# Patient Record
Sex: MALE
[De-identification: names, ages, dates, MRNs, and addresses within clinical notes are randomized per-mention and may not be internally consistent; named-entity substitution may affect disease eponyms.]

---

## 2023-03-20 PROBLEM — Z00.00 ENCOUNTER FOR PREVENTIVE HEALTH EXAMINATION: Status: ACTIVE | Noted: 2023-03-20

## 2023-03-28 ENCOUNTER — APPOINTMENT (OUTPATIENT)
Dept: UROLOGY | Facility: CLINIC | Age: 43
End: 2023-03-28
Payer: MEDICAID

## 2023-03-28 VITALS
SYSTOLIC BLOOD PRESSURE: 131 MMHG | OXYGEN SATURATION: 99 % | TEMPERATURE: 97.1 F | HEIGHT: 67 IN | DIASTOLIC BLOOD PRESSURE: 80 MMHG | WEIGHT: 175 LBS | BODY MASS INDEX: 27.47 KG/M2 | HEART RATE: 71 BPM

## 2023-03-28 DIAGNOSIS — Z30.09 ENCOUNTER FOR OTHER GENERAL COUNSELING AND ADVICE ON CONTRACEPTION: ICD-10-CM

## 2023-03-28 PROCEDURE — 99203 OFFICE O/P NEW LOW 30 MIN: CPT

## 2023-03-28 NOTE — ASSESSMENT
[FreeTextEntry1] : Very pleasant 43 year old gentleman who presents for evaluation and counseling for vasectomy\par -Patient was counseled on the risks of a vasectomy for elective sterilization. He was counseled that this is intended to be a nonreversible, permanent procedure. Procedural details were discussed with the patient at length. We discussed that he will need to use an alternative form of contraception until a postoperative semen analysis demonstrates that he is sterile. Patient understands the risks of the procedure, including but not limited to failure of the procedure, bleeding, infection, short-term and chronic testicular discomfort or abdominal pain, and wishes to proceed. [facility] consent form signed in the office. Will schedule procedure after 30 day period.\par

## 2023-03-28 NOTE — HISTORY OF PRESENT ILLNESS
[FreeTextEntry1] : Very pleasant 43 year old gentleman who presents for evaluation and counseling for vasectomy. Patient reports that he has 2 children and does not desire additional children. He reports that his partner also does not desire additional children. She is unable to accompany him to the visit today, however is supportive of his decision to undergo a vasectomy for permanent sterilization. He denies problems with erections. He denies problems with ejaculation. He denies difficulty with urination. No other complaints.\par

## 2023-06-05 ENCOUNTER — APPOINTMENT (OUTPATIENT)
Dept: UROLOGY | Facility: CLINIC | Age: 43
End: 2023-06-05

## 2023-07-24 ENCOUNTER — NON-APPOINTMENT (OUTPATIENT)
Age: 43
End: 2023-07-24

## 2023-07-24 ENCOUNTER — APPOINTMENT (OUTPATIENT)
Dept: UROLOGY | Facility: CLINIC | Age: 43
End: 2023-07-24

## 2023-08-31 ENCOUNTER — EMERGENCY (EMERGENCY)
Facility: HOSPITAL | Age: 43
LOS: 1 days | Discharge: HOME | End: 2023-08-31
Attending: STUDENT IN AN ORGANIZED HEALTH CARE EDUCATION/TRAINING PROGRAM
Payer: MEDICAID

## 2023-08-31 VITALS
OXYGEN SATURATION: 96 % | TEMPERATURE: 100 F | RESPIRATION RATE: 20 BRPM | SYSTOLIC BLOOD PRESSURE: 154 MMHG | HEART RATE: 113 BPM | HEIGHT: 67 IN | WEIGHT: 177.91 LBS | DIASTOLIC BLOOD PRESSURE: 93 MMHG

## 2023-08-31 PROCEDURE — 99283 EMERGENCY DEPT VISIT LOW MDM: CPT

## 2023-09-01 VITALS
SYSTOLIC BLOOD PRESSURE: 122 MMHG | HEART RATE: 89 BPM | TEMPERATURE: 98 F | OXYGEN SATURATION: 99 % | RESPIRATION RATE: 18 BRPM | DIASTOLIC BLOOD PRESSURE: 68 MMHG

## 2023-09-01 PROCEDURE — 99283 EMERGENCY DEPT VISIT LOW MDM: CPT

## 2023-09-01 RX ORDER — OXYCODONE HYDROCHLORIDE 5 MG/1
1 TABLET ORAL
Qty: 12 | Refills: 0
Start: 2023-09-01 | End: 2023-09-03

## 2023-09-01 RX ORDER — OXYCODONE HYDROCHLORIDE 5 MG/1
5 TABLET ORAL ONCE
Refills: 0 | Status: DISCONTINUED | OUTPATIENT
Start: 2023-09-01 | End: 2023-09-01

## 2023-09-01 RX ADMIN — Medication 50 MILLIGRAM(S): at 01:29

## 2023-09-01 RX ADMIN — OXYCODONE HYDROCHLORIDE 5 MILLIGRAM(S): 5 TABLET ORAL at 01:30

## 2023-09-01 NOTE — ED ADULT NURSE NOTE - NSFALLRISKINTERV_ED_ALL_ED
Assistance OOB with selected safe patient handling equipment if applicable/Assistance with ambulation/Communicate fall risk and risk factors to all staff, patient, and family/Monitor gait and stability/Provide visual cue: yellow wristband, yellow gown, etc/Reinforce activity limits and safety measures with patient and family/Call bell, personal items and telephone in reach/Instruct patient to call for assistance before getting out of bed/chair/stretcher/Non-slip footwear applied when patient is off stretcher/[place] to call system/Physically safe environment - no spills, clutter or unnecessary equipment/Purposeful Proactive Rounding/Room/bathroom lighting operational, light cord in reach Assistance OOB with selected safe patient handling equipment if applicable/Assistance with ambulation/Communicate fall risk and risk factors to all staff, patient, and family/Monitor gait and stability/Provide visual cue: yellow wristband, yellow gown, etc/Reinforce activity limits and safety measures with patient and family/Call bell, personal items and telephone in reach/Instruct patient to call for assistance before getting out of bed/chair/stretcher/Non-slip footwear applied when patient is off stretcher/[place] to call system/Physically safe environment - no spills, clutter or unnecessary equipment/Purposeful Proactive Rounding/Room/bathroom lighting operational, light cord in reach Assistance OOB with selected safe patient handling equipment if applicable/Assistance with ambulation/Communicate fall risk and risk factors to all staff, patient, and family/Monitor gait and stability/Provide visual cue: yellow wristband, yellow gown, etc/Reinforce activity limits and safety measures with patient and family/Call bell, personal items and telephone in reach/Instruct patient to call for assistance before getting out of bed/chair/stretcher/Non-slip footwear applied when patient is off stretcher/[place] to call system/Physically safe environment - no spills, clutter or unnecessary equipment/Purposeful Proactive Rounding/Room/bathroom lighting operational, light cord in reach

## 2023-09-01 NOTE — ED PROVIDER NOTE - NSFOLLOWUPINSTRUCTIONS_ED_ALL_ED_FT
your prescriptions and take as directed.    Return to the emergency room for any new or worsening symptoms.    See attached information on gout flare.    Gout    A foot with gout, showing uric acid crystals in a joint, along with an image that shows swelling on the outside of the foot.  Gout is painful swelling of your joints. Gout is a type of arthritis. It is caused by having too much uric acid in your body. Uric acid is a chemical that is made when your body breaks down substances called purines. If your body has too much uric acid, sharp crystals can form and build up in your joints. This causes pain and swelling.    Gout attacks can happen quickly and be very painful (acute gout). Over time, the attacks can affect more joints and happen more often (chronic gout).    What are the causes?  Gout is caused by too much uric acid in your blood. This can happen because:  Your kidneys do not remove enough uric acid from your blood.  Your body makes too much uric acid.  You eat too many foods that are high in purines. These foods include organ meats, some seafood, and beer.  Trauma or stress can bring on an attack.    What increases the risk?  Having a family history of gout.  Being male and middle-aged.  Being female and having gone through menopause.  Having an organ transplant.  Taking certain medicines.  Having certain conditions, such as:  Being very overweight (obese).  Lead poisoning.  Kidney disease.  A skin condition called psoriasis.  Other risks include:  Losing weight too quickly.  Not having enough water in the body (being dehydrated).  Drinking alcohol, especially beer.  Drinking beverages that are sweetened with a type of sugar called fructose.  What are the signs or symptoms?  A foot and ankle with swelling and shiny, purple skin caused by gout.   An attack of acute gout often starts at night and usually happens in just one joint. The most common place is the big toe. Other joints that may be affected include joints of the feet, ankle, knee, fingers, wrist, or elbow. Symptoms may include:  Very bad pain.  Warmth.  Swelling.  Stiffness.  Tenderness. The affected joint may be very painful to touch.  Shiny, red, or purple skin.  Chills and fever.  Chronic gout may cause symptoms more often. More joints may be involved. You may also have white or yellow lumps (tophi) on your hands or feet or in other areas near your joints.    How is this treated?  Treatment for an acute attack may include medicines for pain and swelling, such as:  NSAIDs, such as ibuprofen.  Steroids taken by mouth or injected into a joint.  Colchicine. This can be given by mouth or through an IV tube.  Treatment to prevent future attacks may include:  Taking small doses of NSAIDs or colchicine daily.  Using a medicine that reduces uric acid levels in your blood, such as allopurinol.  Making changes to your diet. You may need to see a food expert (dietitian) about what to eat and drink to prevent gout.  Follow these instructions at home:  During a gout attack    Bag of ice on a towel on the skin.   If told, put ice on the painful area. To do this:  Put ice in a plastic bag.  Place a towel between your skin and the bag.  Leave the ice on for 20 minutes, 2–3 times a day.  Take off the ice if your skin turns bright red. This is very important. If you cannot feel pain, heat, or cold, you have a greater risk of damage to the area.  Raise the painful joint above the level of your heart as often as you can.  Rest the joint as much as possible. If the joint is in your leg, you may be given crutches.  Follow instructions from your doctor about what you cannot eat or drink.  Avoiding future gout attacks    Eat a low-purine diet. Avoid foods and drinks such as:  Liver.  Kidney.  Anchovies.  Asparagus.  Herring.  Mushrooms.  Mussels.  Beer.  Stay at a healthy weight. If you want to lose weight, talk with your doctor. Do not lose weight too fast.  Start or continue an exercise plan as told by your doctor.  Eating and drinking    Avoid drinks sweetened by fructose.  Drink enough fluids to keep your pee (urine) pale yellow.  If you drink alcohol:  Limit how much you have to:  0–1 drink a day for women who are not pregnant.  0–2 drinks a day for men.  Know how much alcohol is in a drink. In the [place] one drink equals one 12 oz bottle of beer (355 mL), one 5 oz glass of wine (148 mL), or one 1½ oz glass of hard liquor (44 mL).  General instructions    Take over-the-counter and prescription medicines only as told by your doctor.  Ask your doctor if you should avoid driving or using machines while you are taking your medicine.  Return to your normal activities when your doctor says that it is safe.  Keep all follow-up visits.  Where to find more information  National Institutes of Health: www.niams.nih.gov  Contact a doctor if:  You have another gout attack.  You still have symptoms of a gout attack after 10 days of treatment.  You have problems (side effects) because of your medicines.  You have chills or a fever.  You have burning pain when you pee (urinate).  You have pain in your lower back or belly.  Get help right away if:  You have very bad pain.  Your pain cannot be controlled.  You cannot pee.  Summary  Gout is painful swelling of the joints.  The most common site of pain is the big toe, but it can affect other joints.  Medicines and avoiding some foods can help to prevent and treat gout attacks.  This information is not intended to replace advice given to you by your health care provider. Make sure you discuss any questions you have with your health care provider.

## 2023-09-01 NOTE — ED ADULT NURSE NOTE - OBJECTIVE STATEMENT
43y male A&OX4 coming in through triage complaining of Left knee pain. No PMHX. Pt reports having swelling and knee pain that started Monday and progressivly gotten worse and decided to get checked in. Pt denies chest pain, shortness of breath, abd pain, N/V/D, fever, cough. Pt left knee is swollen compared to right knee. Pt pending dispo.

## 2023-09-01 NOTE — ED PROVIDER NOTE - PHYSICAL EXAMINATION
Const: Well-nourished, Well-developed, appearing stated age.  Eyes: no conjunctival injection, and symmetrical lids.  HEENT: Head NCAT, no lesions.   Neck: Symmetric, trachea midline.   CVS: +S1/S2, Radial and DP pulses 2+ b/l.   RESP: Unlabored respiratory effort. Clear to auscultation bilaterally.  GI: Nontender/Nondistended, No CVA tenderness b/l.   MSK: +  Left knee warm to touch, not erythematous.  passive range of motion intact.  Active range of motion slightly diminished.  Skin: Warm, dry and intact.   Neuro: Fluent Speech. Moving all extremities.   Psych: Awake, Alert, & Oriented (AAO) x3. Appropriate mood and affect.

## 2023-09-01 NOTE — ED PROVIDER NOTE - PROGRESS NOTE DETAILS
POCUS of the left knee without significant fluid collection in the joint.  No indication for drainage based on story, exam, POCUS.  Will reassess to dispo. Jason Lema, ED Attending Jason Lema, ED Attending Pt feels much better, will DC home with prednisone and a few days of Oxy.  Able to range his knee actively now.  Able to ambulate.  Vital signs improved.  Well-appearing.  Plan for DC  With return precautions and follow-up instructions with teach back.

## 2023-09-01 NOTE — ED PROVIDER NOTE - PATIENT PORTAL LINK FT
You can access the FollowMyHealth Patient Portal offered by [facility] by registering at the following website: [email or web address]. By joining [employer]’s FollowMyHealth portal, you will also be able to view your health information using other applications (apps) compatible with our system. You can access the FollowMyHealth Patient Portal offered by [facility] by registering at the following website: [email or web address]. By joining [employer]’s FollowMyHealth portal, you will also be able to view your health information using other applications (apps) compatible with our system. You can access the FollowMyHealth Patient Portal offered by [facility] by registering at the following website: [email or web address]. By joining [employer]’s FollowMyHealth portal, you will also be able to view your health information using other applications (apps) compatible with our system.

## 2023-09-01 NOTE — ED PROVIDER NOTE - CLINICAL SUMMARY MEDICAL DECISION MAKING FREE TEXT BOX
43-year-old male with past medical history of gout presenting with chief complaint of left knee pain.  No inciting event or injury.  States that is consistent with prior gout flare.  On exam, vital signs stable, patient able to range the knee.  On POCUS very minimal fluid in the joint space.  Do not suspect septic joint given patient has range of motion and barely any fluid on POCUS.  Likely failing treatment with NSAIDs, flare likely secondary to recent changes in p.o. intake/vacation.   Plan for Oxy, prednisone.  Would benefit from a prednisone taper.  Patient has follow-up.  if symptoms improve, plan for reassessment and disposition. Jason Lema, ED Attending

## 2024-10-26 ENCOUNTER — EMERGENCY (EMERGENCY)
Facility: HOSPITAL | Age: 44
LOS: 1 days | Discharge: HOME | End: 2024-10-26
Attending: EMERGENCY MEDICINE
Payer: SELF-PAY

## 2024-10-26 VITALS
SYSTOLIC BLOOD PRESSURE: 165 MMHG | WEIGHT: 184.09 LBS | HEIGHT: 67 IN | RESPIRATION RATE: 18 BRPM | OXYGEN SATURATION: 99 % | DIASTOLIC BLOOD PRESSURE: 82 MMHG | TEMPERATURE: 98 F | HEART RATE: 86 BPM

## 2024-10-26 LAB
B PERT IGG+IGM PNL SER: ABNORMAL
COLOR FLD: YELLOW
FLUID INTAKE SUBSTANCE CLASS: SIGNIFICANT CHANGE UP
GLUCOSE FLD-MCNC: 107 MG/DL — SIGNIFICANT CHANGE UP
LYMPHOCYTES # FLD: 1 % — SIGNIFICANT CHANGE UP
MONOS+MACROS # FLD: 11 % — SIGNIFICANT CHANGE UP
NEUTROPHILS-BODY FLUID: 88 % — SIGNIFICANT CHANGE UP
PROT FLD-MCNC: 3.7 G/DL — SIGNIFICANT CHANGE UP
RCV VOL RI: 1190 CELLS/UL — HIGH (ref 0–5)
SYNOVIAL CRYSTALS CLARITY: SIGNIFICANT CHANGE UP
SYNOVIAL CRYSTALS COLOR: YELLOW
SYNOVIAL CRYSTALS ID: SIGNIFICANT CHANGE UP
SYNOVIAL CRYSTALS TUBE: SIGNIFICANT CHANGE UP
TOTAL NUCLEATED CELL COUNT, BODY FLUID: 8125 CELLS/UL — HIGH (ref 0–5)
TUBE TYPE: SIGNIFICANT CHANGE UP

## 2024-10-26 PROCEDURE — 20610 DRAIN/INJ JOINT/BURSA W/O US: CPT | Mod: RT

## 2024-10-26 PROCEDURE — 73562 X-RAY EXAM OF KNEE 3: CPT | Mod: 26,RT

## 2024-10-26 PROCEDURE — 87070 CULTURE OTHR SPECIMN AEROBIC: CPT

## 2024-10-26 PROCEDURE — 89051 BODY FLUID CELL COUNT: CPT

## 2024-10-26 PROCEDURE — 99284 EMERGENCY DEPT VISIT MOD MDM: CPT | Mod: 25

## 2024-10-26 PROCEDURE — 84157 ASSAY OF PROTEIN OTHER: CPT

## 2024-10-26 PROCEDURE — 82945 GLUCOSE OTHER FLUID: CPT

## 2024-10-26 PROCEDURE — 87015 SPECIMEN INFECT AGNT CONCNTJ: CPT

## 2024-10-26 PROCEDURE — 87205 SMEAR GRAM STAIN: CPT

## 2024-10-26 PROCEDURE — 89060 EXAM SYNOVIAL FLUID CRYSTALS: CPT

## 2024-10-26 PROCEDURE — 73562 X-RAY EXAM OF KNEE 3: CPT

## 2024-10-26 PROCEDURE — 96372 THER/PROPH/DIAG INJ SC/IM: CPT | Mod: XU

## 2024-10-26 PROCEDURE — 87075 CULTR BACTERIA EXCEPT BLOOD: CPT

## 2024-10-26 RX ORDER — ACETAMINOPHEN 325 MG
975 TABLET ORAL ONCE
Refills: 0 | Status: COMPLETED | OUTPATIENT
Start: 2024-10-26 | End: 2024-10-26

## 2024-10-26 RX ORDER — LIDOCAINE HCL 20 MG/ML
10 AMPUL (ML) INJECTION ONCE
Refills: 0 | Status: COMPLETED | OUTPATIENT
Start: 2024-10-26 | End: 2024-10-26

## 2024-10-26 RX ORDER — KETOROLAC TROMETHAMINE 10 MG/1
30 TABLET, FILM COATED ORAL ONCE
Refills: 0 | Status: DISCONTINUED | OUTPATIENT
Start: 2024-10-26 | End: 2024-10-26

## 2024-10-26 RX ORDER — METHYLPREDNISOLONE ACETATE 80 MG/ML
1 INJECTION, SUSPENSION INTRA-ARTICULAR; INTRALESIONAL; INTRAMUSCULAR; SOFT TISSUE
Qty: 1 | Refills: 0
Start: 2024-10-26

## 2024-10-26 RX ADMIN — KETOROLAC TROMETHAMINE 30 MILLIGRAM(S): 10 TABLET, FILM COATED ORAL at 20:51

## 2024-10-26 RX ADMIN — Medication 0.6 MILLIGRAM(S): at 20:51

## 2024-10-26 RX ADMIN — Medication 10 MILLILITER(S): at 21:45

## 2024-10-26 RX ADMIN — Medication 975 MILLIGRAM(S): at 20:51

## 2024-10-26 NOTE — ED PROVIDER NOTE - PATIENT PORTAL LINK FT
You can access the FollowMyHealth Patient Portal offered by [facility] by registering at the following website: [email or web address]. By joining [employer]’s FollowMyHealth portal, you will also be able to view your health information using other applications (apps) compatible with our system.

## 2024-10-26 NOTE — ED PROVIDER NOTE - PHYSICAL EXAMINATION
NAD. VSS. Afebrile. Neck supple. Lungs clear. No spinal tender. ABD soft, non tender. +Right knee: generalized swelling, tender, and diminished ROMs. No eryth or cellulitis. No calf tender or swelling. N/V- intact. No focal neuro deficit.

## 2024-10-26 NOTE — ED PROCEDURE NOTE - PROCEDURE ADDITIONAL DETAILS
Sterile gauze and ACE wrap applied after the procedure.  Pt states feeling much improved after the procedure. Sterile gauze and ACE wrap applied after the procedure.  Pt states feeling much improved after the procedure.  Crutches given with well demo.

## 2024-10-26 NOTE — ED PROVIDER NOTE - NSFOLLOWUPCLINICS_GEN_ALL_ED_FT
[facility]  Rheumatology  [address] [address]  Phone: [phone]  Fax:

## 2024-10-26 NOTE — ED PROVIDER NOTE - NSFOLLOWUPINSTRUCTIONS_ED_ALL_ED_FT
Please see the information of Gout.    Hydrate.    Elevate the affected knee.    Take Ibuprofen (600mg every 8hours with food) or Tylenol (2 tablets of 500mg every 8hours) as needed for pain.        Follow up with your Dr. or rheumatology clinic for reevaluation, call Monday for appointment.    Return for any concerns, fever, chills, numbness, weakness, difficult walking, or worsening pain. Please see the information of Gout.    Hydrate.    Elevate the affected knee.    Use crutches as instructed.    Take Medrol dose pack as prescribed.     Take your Aleve as the package instructed or Tylenol (2 tablets of 500mg every 8hours) as needed for pain.    Follow up with your Dr. or rheumatology clinic for reevaluation, call Monday for appointment.    Return for any concerns, fever, chills, numbness, weakness, difficult walking, or worsening pain.

## 2024-10-26 NOTE — ED PROVIDER NOTE - ATTENDING APP SHARED VISIT CONTRIBUTION OF CARE
I, Beto iDego MD, Emergency Medicine Attending Physician, personally saw and examined the patient and I personally made/approve the management plan and take responsibility for the patient management.    MDM: 44-year-old male with history of gout of the knee, who presents with right knee pain and swelling that started on Tuesday.  Patient was seen by PCP and was started on indomethacin without significant relief.  Reports pain with range of motion of knee and with ambulation.    ROS: denies trauma, fevers, chills, numbness, weakness, or any other areas of pain.     On examination, patient with elevated blood pressure but otherwise stable vitals.  MSK examination of the right knee shows swelling and reduced range of motion 0-90 degrees, normal extension mechanism.  No erythema or warmth.  No gross ligamentous laxity.  No focal bony tenderness.  Neurovascularly intact distally with 5/5 strength, normal sensation, equal pulses and brisk capillary refill, soft compartments.    Will obtain x-ray to evaluate for acute bony pathology.  Pain control and reassess.  Will treat patient with colchicine for possible gout.  Patient consented for arthrocentesis.    Arthrocentesis performed, obtained 25 mL, sent for fluid analysis. I, Beto Diego MD, Emergency Medicine Attending Physician, personally saw and examined the patient and I personally made/approve the management plan and take responsibility for the patient management.    MDM: 44-year-old male with history of gout of the knee, who presents with right knee pain and swelling that started on Tuesday.  Patient was seen by PCP and was started on indomethacin without significant relief.  Reports pain with range of motion of knee and with ambulation.    ROS: denies trauma, fevers, chills, numbness, weakness, or any other areas of pain.     On examination, patient with elevated blood pressure but otherwise stable vitals.  MSK examination of the right knee shows swelling and reduced range of motion 0-90 degrees, normal extension mechanism.  No erythema or warmth.  No gross ligamentous laxity.  No focal bony tenderness.  Neurovascularly intact distally with 5/5 strength, normal sensation, equal pulses and brisk capillary refill, soft compartments.    Will obtain x-ray to evaluate for acute bony pathology.  Pain control and reassess.  Will treat patient with colchicine for possible gout.  Patient consented for arthrocentesis.    My independent interpretation of the right knee x-ray images shows no acute fracture or dislocation, but does show joint effusion.   More details to be seen in the official radiologist read.     Arthrocentesis performed, obtained 25 mL, sent for fluid analysis.    After procedure, patient with significantly improved pain and improved range of motion 0–110.    Synovial fluid analysis shows WBC 8,000, and no crystals seen, Gram stain showed no organisms    At 11:37 PM, patient reassessed and reports improved symptoms. Repeat MSK examination shows improved ROM, soft compartments, and NVI. Ambulatory in the ED safely with stable for discharge with close follow-up and strict return precautions. Discussed the indications and side-effects of applicable medications.  Informed patient of all diagnostic test results including labs and imaging. The patient has been informed of all concerning signs and symptoms to return to Emergency Department, the necessity to follow up with PMD within 2-3 days and rheumatology within 1 week was explained, or to return to the ED if unable to follow-up appropriately, and the patient reports understanding of above with capacity and insight.  Crutches for assistance.

## 2024-10-26 NOTE — ED PROCEDURE NOTE - ATTENDING APP SHARED VISIT CONTRIBUTION OF CARE
I, EM Attending, Beto Diego was present for and supervised the entirety of the procedure performed by the Resident/Fellow Physician or NORA.

## 2024-10-26 NOTE — ED ADULT NURSE NOTE - OBJECTIVE STATEMENT
44YOM hx Gout BIB self c/o R knee pain x5 days, pt reports going to [facility] this past wednesday for gout flareup in R knee, was given indomethecin PO, pt reports feeling increased pain in R knee, nausea/vomiting/fever/chills since starting medication, has been unable to bear weight comfortably on R leg and started using a cane. AOx4, abdomen soft nontender nondistended, R knee swelling edematous nonpitting, warm to touch, painful ROM and tender to palpation. Highest temp at home 99.9 oral, afebrile in waiting room. VSS.

## 2024-10-26 NOTE — ED PROVIDER NOTE - OBJECTIVE STATEMENT
45yo male with PMHx of Gout presents to ED with right knee pain and swelling since Tuesday. Denies injury. Pt's seen by PMD Wednesday and been on Indocin without relief. Denies fever, chills, or recent cold symptoms. Denies sensory changes or weakness to extremities.

## 2024-10-27 PROBLEM — M10.9 GOUT, UNSPECIFIED: Chronic | Status: ACTIVE | Noted: 2023-09-01

## 2025-01-16 ENCOUNTER — APPOINTMENT (OUTPATIENT)
Dept: RHEUMATOLOGY | Facility: CLINIC | Age: 45
End: 2025-01-16

## 2025-03-31 NOTE — ED PROVIDER NOTE - NSCAREINITIATED _GEN_ER
Hpi Title: Evaluation of Skin Lesions How Severe Are Your Spot(S)?: mild Additional History: Patient has a few spots of concern one on the right side of the neck and other on the chest. Maryanne Hidalgo(NP)